# Patient Record
Sex: MALE | URBAN - METROPOLITAN AREA
[De-identification: names, ages, dates, MRNs, and addresses within clinical notes are randomized per-mention and may not be internally consistent; named-entity substitution may affect disease eponyms.]

---

## 2022-11-15 ENCOUNTER — APPOINTMENT (RX ONLY)
Dept: URBAN - METROPOLITAN AREA CLINIC 158 | Facility: CLINIC | Age: 87
Setting detail: DERMATOLOGY
End: 2022-11-15

## 2022-11-29 ENCOUNTER — APPOINTMENT (RX ONLY)
Dept: URBAN - METROPOLITAN AREA CLINIC 158 | Facility: CLINIC | Age: 87
Setting detail: DERMATOLOGY
End: 2022-11-29

## 2022-11-29 DIAGNOSIS — L29.8 OTHER PRURITUS: ICD-10-CM

## 2022-11-29 DIAGNOSIS — L29.89 OTHER PRURITUS: ICD-10-CM

## 2022-11-29 PROCEDURE — ? TREATMENT REGIMEN

## 2022-11-29 PROCEDURE — ? PATIENT SPECIFIC COUNSELING

## 2022-11-29 PROCEDURE — 99212 OFFICE O/P EST SF 10 MIN: CPT

## 2022-11-29 NOTE — PROCEDURE: TREATMENT REGIMEN
Detail Level: Detailed
Plan: 11/29/22: initiate Allegra once every morning and Zyrtec once nightly And apply otc sarna anti itch.\\nBegin elimination diet trial; eliminate Pepsi and [place] juice for a lifetime

## 2022-11-29 NOTE — PROCEDURE: PATIENT SPECIFIC COUNSELING
developed generalized pruritus 5 years ago. Received in office UVB therapy 3x/wk for 6 months. Some improvement.\\n1 year ago baseline itch increased. So Dr Bradford who ordered serum IgE testing (food and [place]) with no allergens other than dust mites.\\n\\nPresent using otc creams; itch is problematic in the evenings and wakens him from sleep however he seems to sleep fairly well overall.\\n\\nMeds he is on include levothyroxine and Symbicort.\\n\\nDiscussed that factors we can control which might be involved in his itch including the things he eats and drinks. Even though he does not had immediate hypersensitivity to the foods tested, he could still have sensitivity to foods and chemical additives; both direct affects and indirect effects are all possible, eg, effects on the microbiome, integrity of the bowel epithelium, etc.\\n\\nSuggested he try an elimination diet for the next month and see if this makes a difference.\\n\\nGiven food list for an animal based diet, thereby excluding significant AMOUNTS OF LECTINS, PHYTIC ACID, OXALATES, FODMAPS.\\n\\nAlso, if not affected by this diet, may screen for \"BP sine rash\" and also autoimmune thyroid disease; if antibodies are high, in some individuals with CU, dosing Synthroid so the TSH is at the LLN may improve or resolve the urticaria.\\nFinally, if other testing such as Hep C, PTH, LDH, SPEP have not been checked, it would be appropriate to do such testing \\n
Detail Level: Detailed

## 2022-12-27 ENCOUNTER — APPOINTMENT (RX ONLY)
Dept: URBAN - METROPOLITAN AREA CLINIC 158 | Facility: CLINIC | Age: 87
Setting detail: DERMATOLOGY
End: 2022-12-27

## 2022-12-27 DIAGNOSIS — L29.89 OTHER PRURITUS: ICD-10-CM

## 2022-12-27 DIAGNOSIS — L29.8 OTHER PRURITUS: ICD-10-CM

## 2022-12-27 PROCEDURE — ? PATIENT SPECIFIC COUNSELING

## 2022-12-27 PROCEDURE — 99212 OFFICE O/P EST SF 10 MIN: CPT

## 2022-12-27 PROCEDURE — ? TREATMENT REGIMEN

## 2022-12-27 NOTE — PROCEDURE: TREATMENT REGIMEN
Detail Level: Detailed
Plan: 11/29/22: initiate Allegra once every morning and Zyrtec once nightly And apply otc sarna anti itch.\\nBegin elimination diet trial; eliminate Pepsi and [place] juice for a lifetime\\n\\n12/27/22: Patient presents with a slight improvement, but Pruitus is still present. Patient has not completely eliminated soda and continues AM Don juice. Patient also has not been able to be consistent with Allegra once every morning and Zyrtec once nightly. Patient states he will do his best to reduce sugar intake and be consistent with Claritin (sub for Allegra as pill size of Allegra makes it hard to swallow) and Zyrtec. Asked Patient to sign for release of recent lab results from other providers (retinal specialist and Dr Bradford).\\nFollow up in a month

## 2022-12-27 NOTE — PROCEDURE: PATIENT SPECIFIC COUNSELING
developed generalized pruritus 5 years ago. Received in office UVB therapy 3x/wk for 6 months. Some improvement.\\n1 year ago baseline itch increased. So Dr Bradford who ordered serum IgE testing (food and [place]) with no allergens other than dust mites.\\n\\nPresent using otc creams; itch is problematic in the evenings and wakens him from sleep however he seems to sleep fairly well overall.\\n\\nMeds he is on include levothyroxine and Symbicort.\\n\\nDiscussed that factors we can control which might be involved in his itch including the things he eats and drinks. Even though he does not had immediate hypersensitivity to the foods tested, he could still have sensitivity to foods and chemical additives; both direct affects and indirect effects are all possible, eg, effects on the microbiome, integrity of the bowel epithelium, etc.\\n\\nSuggested he try an elimination diet for the next month and see if this makes a difference.\\n\\nGiven food list for an animal based diet, thereby excluding significant AMOUNTS OF LECTINS, PHYTIC ACID, OXALATES, FODMAPS.\\n\\nAlso, if not affected by this diet, may screen for \"BP sine rash\" and also autoimmune thyroid disease; if antibodies are high, in some individuals with CU, dosing Synthroid so the TSH is at the LLN may improve or resolve the urticaria.\\nFinally, if other testing such as Hep C, PTH, LDH, SPEP have not been checked, it would be appropriate to do such testing. Also, if not checked recently, HgbA1C
Detail Level: Detailed